# Patient Record
Sex: FEMALE | Race: WHITE | NOT HISPANIC OR LATINO
[De-identification: names, ages, dates, MRNs, and addresses within clinical notes are randomized per-mention and may not be internally consistent; named-entity substitution may affect disease eponyms.]

---

## 2023-01-19 ENCOUNTER — APPOINTMENT (OUTPATIENT)
Dept: VASCULAR SURGERY | Facility: CLINIC | Age: 63
End: 2023-01-19
Payer: COMMERCIAL

## 2023-01-19 VITALS
TEMPERATURE: 97.4 F | SYSTOLIC BLOOD PRESSURE: 108 MMHG | HEART RATE: 74 BPM | HEIGHT: 63 IN | BODY MASS INDEX: 24.45 KG/M2 | DIASTOLIC BLOOD PRESSURE: 76 MMHG | WEIGHT: 138 LBS | OXYGEN SATURATION: 99 %

## 2023-01-19 DIAGNOSIS — I83.813 VARICOSE VEINS OF BILATERAL LOWER EXTREMITIES WITH PAIN: ICD-10-CM

## 2023-01-19 DIAGNOSIS — I83.893 VARICOSE VEINS OF BILATERAL LOWER EXTREMITIES WITH OTHER COMPLICATIONS: ICD-10-CM

## 2023-01-19 PROBLEM — Z00.00 ENCOUNTER FOR PREVENTIVE HEALTH EXAMINATION: Status: ACTIVE | Noted: 2023-01-19

## 2023-01-19 PROCEDURE — 99203 OFFICE O/P NEW LOW 30 MIN: CPT | Mod: 25

## 2023-01-19 PROCEDURE — 93970 EXTREMITY STUDY: CPT

## 2023-01-19 NOTE — HISTORY OF PRESENT ILLNESS
[FreeTextEntry1] : Pt with hx breast cancer and has had b/l mastectomy with implants; allergy to adhesives.

## 2023-01-19 NOTE — PHYSICAL EXAM
[JVD] : no jugular venous distention  [2+] : left 2+ [Ankle Swelling (On Exam)] : present [Ankle Swelling Bilaterally] : bilaterally  [Ankle Swelling On The Right] : mild [Varicose Veins Of Lower Extremities] : bilaterally [Ankle Swelling On The Left] : moderate [] : bilaterally [No Rash or Lesion] : No rash or lesion [Purpura] : no purpura  [Petechiae] : no petechiae [Skin Ulcer] : no ulcer [Skin Induration] : no induration [Alert] : alert [Oriented to Person] : oriented to person [Oriented to Place] : oriented to place [Oriented to Time] : oriented to time [Calm] : calm [de-identified] : wdwn nad [de-identified] : wnl [FreeTextEntry1] : b/l diffuse surface varicose veins, no venous ulcers, intact arterial pulses [de-identified] : wnl [de-identified] : as above

## 2023-01-19 NOTE — REASON FOR VISIT
[Initial Evaluation] : an initial evaluation [FreeTextEntry1] : pt with hx b/l severe venous ds who has had b/l gsv stripping elsewhere in the remote past - has also had foam sclerotherapy and stab phlebectomy with Dr Justino Mcnair in the past - pt presents now after referral from Dr Chan for c/o b/l leg heaviness, aches, swelling and diffuse small surface veins both legs - b/l vle performed by RVT shows rt and left gsv not visualized c/w prior treatment, rt lsv ++ reflux, no dvt no svt no deep reflux b/l legs. Pt to wear waist high support hose 20-30 mmHg Rx given - will return for rt lsv rfa and then b/l sclerotherapy.

## 2023-02-02 ENCOUNTER — APPOINTMENT (OUTPATIENT)
Dept: VASCULAR SURGERY | Facility: CLINIC | Age: 63
End: 2023-02-02
Payer: COMMERCIAL

## 2023-02-02 VITALS
SYSTOLIC BLOOD PRESSURE: 107 MMHG | DIASTOLIC BLOOD PRESSURE: 73 MMHG | TEMPERATURE: 97.7 F | OXYGEN SATURATION: 98 % | HEART RATE: 73 BPM

## 2023-02-02 PROCEDURE — 93971 EXTREMITY STUDY: CPT | Mod: RT

## 2023-02-02 PROCEDURE — 99213 OFFICE O/P EST LOW 20 MIN: CPT | Mod: 25

## 2023-02-02 NOTE — PHYSICAL EXAM
[JVD] : no jugular venous distention  [2+] : left 2+ [Ankle Swelling (On Exam)] : present [Ankle Swelling Bilaterally] : bilaterally  [Ankle Swelling On The Right] : mild [Varicose Veins Of Lower Extremities] : bilaterally [Ankle Swelling On The Left] : moderate [] : bilaterally [No Rash or Lesion] : No rash or lesion [Purpura] : no purpura  [Petechiae] : no petechiae [Skin Ulcer] : no ulcer [Skin Induration] : no induration [Alert] : alert [Oriented to Person] : oriented to person [Oriented to Place] : oriented to place [Oriented to Time] : oriented to time [Calm] : calm [de-identified] : wdwn nad [de-identified] : wnl [FreeTextEntry1] : b/l diffuse surface varicose veins, no venous ulcers, intact arterial pulses [de-identified] : wnl [de-identified] : as above

## 2023-02-09 ENCOUNTER — APPOINTMENT (OUTPATIENT)
Dept: VASCULAR SURGERY | Facility: CLINIC | Age: 63
End: 2023-02-09

## 2023-05-11 ENCOUNTER — APPOINTMENT (OUTPATIENT)
Dept: VASCULAR SURGERY | Facility: CLINIC | Age: 63
End: 2023-05-11
Payer: COMMERCIAL

## 2023-05-11 VITALS
OXYGEN SATURATION: 97 % | SYSTOLIC BLOOD PRESSURE: 117 MMHG | TEMPERATURE: 97.2 F | DIASTOLIC BLOOD PRESSURE: 76 MMHG | HEART RATE: 83 BPM

## 2023-05-11 PROCEDURE — 36475 ENDOVENOUS RF 1ST VEIN: CPT | Mod: RT

## 2023-05-11 NOTE — REASON FOR VISIT
[Procedure: _________] : a [unfilled] procedure visit [FreeTextEntry1] : Patient has severe reflux right lsv and here for right lsv RFA procedure.  LYNDSAY CARLOS proceeded with right lsv RFA with no complications.  LYNDSAY CARLOS will follow up in one week for routine visit with VLE.\par

## 2023-05-11 NOTE — PROCEDURE
[FreeTextEntry1] : right lsv rfa [FreeTextEntry3] : \par Right lower extremity varicose veins with inflammation, fatigue, heaviness, pain, swelling, cramping heaviness, and dermatitis.  Pt was diagnosed with Chronic Venous insufficiency (CVI), chronic venous hypertension and venous reflux which requires thermal ablation with endovenous radiofrequency to treat the truncal/axial vein reflux (Radiofrequency Ablation, RFA).\par \par Ultrasound examination demonstrated reflux in the right LSV vein with reflux into the patient’s varicosities.  A trial of compression stockings, exercise, elevation, and pain medication was attempted without relief and as such, this patient was offered RFA as definitive treatment.  After explaining risks and benefits, informed consent was obtained and the patient was brought to the treatment room.\par \par The patient was escorted to the procedure room and placed in the prone position on the examination table.   The right leg was prepped and draped in the usual sterile fashion and time-out was called.  A sonographic probe was placed into a sterile sheath and the lower extremity was imaged. A suitable access site in the superficial truncal vein to be treated was identified using sonographic guidance and marked at the skin level. 1 mL of 1% lidocaine without epinephrine was administered subcutaneously using a 25G needle.  The RF catheter, dilator and introducer were flushed with saline and wiped down and placed on the sterile field.\par \par Using standard Seldinger technique, access to the saphenous vein was obtained with the needle and corresponding guidewire.  The needle was removed and the 7Fr introducer sheath with dilator was advanced over the wire into the vein. The guide wire and dilator were removed and the RFA fiber catheter was placed into the vein through the introducer sheath.  The position of the RFA  2 cm below the Sapheno-popliteal  Junction was verified using ultrasound guidance.     \par \par Local tumescent anesthesia, under ultrasound guidance, was administered circumferentially around the entire length of vein in the perivenous compartment to ensure a complete “halo” of anesthetic around the vein. \par Tumescent anesthesia:  250 mL 0.9% Sodium Chloride was poured into a sterile blue basin and 50 cc of sterile injectable lidocaine 1% (without Epinephrine) was added using a 20 mL syringe.\par \par The RFA location was again confirmed to be appropriate and the RFA energy was applied by pressing the hand button.  The proximal 3 cm was treated for 2 cycles at 20 seconds. The RFA fiber was then withdrawn in sequential 3 cm segments and each section was treated with one cycle of 20 seconds thermal energy application. The system was monitored to ensure that the vein wall temperature was within 120 +/- 5 degrees C and the generator output was well below its maximum starting power. \par \par The operation of the RFA was ceased when the fiber end was at its most distal marker as indicated by the distal tip mckeon lines.  The sheath and RFA fiber catheter were removed together and manual pressure was applied at access site for hemostasis.  Post procedure, the vein was imaged and showed successful closure along the entire treated length with a patent proximal most 2-3 cm and sapheno-popliteal junction.  Dry bandaid was applied to the access site and a compression stocking 20 - 30 mm Hg was applied to the treated extremity.  The patient tolerated the procedure well with no complications.  Vital signs stable, patient was monitored throughout procedure.  \par \par Refer to procedure sheet for procedure start and end time, # of total cycles,  total treated vein length treated,  and total treatment duration time documentation. \par \par Post-Op Instructions:  The following instructions were reviewed with the patient and a print out of the instructions provided to patient at time of visit:   1)  Compression stocking to be worn 24 hours per day x 7 days.  2)  Do not get the stocking wet for the first 3 days.  3)  Ambulation immediately following procedure and as much as possible for the first 7 days.  4)  Contact the office if any questions or concerns. 5)  Patient must follow-up within the first week for post procedure reflux study performed in office.  Reviewed with the patient the follow up visit is to ensure that there are no complications such as a deep vein thrombosis (DVT) or endovenous heat-induced thrombus (EHIT).  Patient acknowledged understanding of post-op instructions and was provided with print out of instructions at time of visit.  \par \par Reviewed with the patient post-procedure RFA  instructions, provided patient with printed copy of instructions along with Dr. Kessler’s cell phone number:  [phone], and advised patient to call for any questions or concerns prior to follow up visit.  All questions answered. \par \par \par

## 2023-05-22 ENCOUNTER — APPOINTMENT (OUTPATIENT)
Dept: VASCULAR SURGERY | Facility: CLINIC | Age: 63
End: 2023-05-22
Payer: COMMERCIAL

## 2023-05-22 VITALS
HEART RATE: 70 BPM | SYSTOLIC BLOOD PRESSURE: 113 MMHG | DIASTOLIC BLOOD PRESSURE: 80 MMHG | TEMPERATURE: 97.2 F | OXYGEN SATURATION: 98 %

## 2023-05-22 PROCEDURE — 99213 OFFICE O/P EST LOW 20 MIN: CPT | Mod: 25

## 2023-05-22 PROCEDURE — 93971 EXTREMITY STUDY: CPT | Mod: RT

## 2023-05-22 NOTE — REASON FOR VISIT
[Follow-Up: _____] : a [unfilled] follow-up visit [FreeTextEntry1] : She is s/p right lsv RFA.  Patient is here today for routine one week follow up visit with ultrasound.  Right leg ultrasound confirms no dvt, no EHIIT, no truncal reflux and right lsv is closed as desired.\par Pt to return for b/l sclerotherapy.

## 2023-05-22 NOTE — DATA REVIEWED
[FreeTextEntry1] : Dr Kessler performed rt lsv vle s/p recent RFA - rt lsv closed, no dvt, no ehit , rt sp junction patent and compressible

## 2023-05-22 NOTE — PHYSICAL EXAM
[2+] : left 2+ [Ankle Swelling (On Exam)] : present [Ankle Swelling Bilaterally] : bilaterally  [Ankle Swelling On The Right] : mild [Varicose Veins Of Lower Extremities] : bilaterally [Ankle Swelling On The Left] : moderate [No Rash or Lesion] : No rash or lesion [Alert] : alert [Oriented to Person] : oriented to person [Oriented to Place] : oriented to place [Oriented to Time] : oriented to time [Calm] : calm [JVD] : no jugular venous distention  [] : not present [Purpura] : no purpura  [Petechiae] : no petechiae [Skin Ulcer] : no ulcer [Skin Induration] : no induration [de-identified] : wdwn nad [de-identified] : wnl [FreeTextEntry1] : b/l diffuse surface varicose veins, no venous ulcers, intact arterial pulses [de-identified] : wnl [de-identified] : as above

## 2023-05-22 NOTE — REVIEW OF SYSTEMS
Plan: Discuss on ILK injection if the area becomes too painful or irritating. Detail Level: Zone [Negative] : Heme/Lymph [FreeTextEntry1] : as above

## 2023-06-06 ENCOUNTER — APPOINTMENT (OUTPATIENT)
Dept: VASCULAR SURGERY | Facility: CLINIC | Age: 63
End: 2023-06-06
Payer: COMMERCIAL

## 2023-06-06 VITALS
SYSTOLIC BLOOD PRESSURE: 112 MMHG | OXYGEN SATURATION: 98 % | DIASTOLIC BLOOD PRESSURE: 74 MMHG | TEMPERATURE: 97.7 F | HEART RATE: 69 BPM

## 2023-06-06 PROCEDURE — 36471 NJX SCLRSNT MLT INCMPTNT VN: CPT | Mod: RT

## 2023-06-06 NOTE — PROCEDURE
[FreeTextEntry1] : right leg sclerotherapy [FreeTextEntry3] : Right lower extremity sclerotherapy.\par \par Venous insufficiency.  Diffuse, painful right lower extremity small branch varicose veins with limb pain, swelling, itching, burning, tenderness and cramping.\par \par Ms. LYNDSAY CARLOS is a 62 year year old F  with right lower extremity symptomatic small branch varicose veins.   The patient presented to the office for localized sclerotherapy injections.  After explaining risks and benefits, informed consent was obtained.  All questions answered.\par \par The patient was brought into the examination room and placed supine on procedure table.  Right leg for sclerotherapy treatment is cleaned with 70% isopropyl alcohol.  Sclerosant was mixed using 2 mL of 0.9% Sodium Chloride Injection and 2 mL of 1% polidocanol (Asclera) using filtered needle into sterile 5 mL syringe; 4 mL of 0.5% Asclera injected.  Filtered needle removed from syringe and sterile 30 G ½ inch needle is attached to syringe with sclerosant.  Using a 30 gauge needle, the sclerosant was directly injected into the symptomatic, superficial small branch varicose veins.   Injections were performed on right lower extremity and the veins were mostly localized to the   [].  At completion, treated area was wiped with dry gauze and thigh high 20-30 mmHg compression stockings placed, to be worn x 72 hours.  Patient tolerated the procedure well with no complications.\par Reviewed with the patient post procedure, sclerotherapy, instructions:  Patient instructed to wear compression stocking continuously for 3 days (72 hours) following the procedure and may take the stocking off for daily shower.  Walk for 15-20 minutes immediately following the procedure and daily for the next few days.  Avoid heavy exercise, sunbathing, hot baths/sauna, extended periods of sitting or standing, such as long plane flights for 2-3 days after treatment. Do not be alarmed if you see bruises, brown spots, black streaks, and lumps.  These are part of the healing process, and most will resolve with time.  It can take several weeks to see final results and additional treatments may be required.  Advised patient to call the office should they have any questions or concerns following sclerotherapy treatment. All questions answered.\par \par \par \par

## 2023-06-06 NOTE — REASON FOR VISIT
[Procedure: _________] : a [unfilled] procedure visit [FreeTextEntry1] : Patient with small painful and cramping, tender superficial branch varicose veins.  She is here for sclerotherapy session, right leg.  Ms. LYNDSAY CARLOS will follow up in 2 weeks for next sclerotherapy session.\par

## 2023-06-21 ENCOUNTER — APPOINTMENT (OUTPATIENT)
Dept: VASCULAR SURGERY | Facility: CLINIC | Age: 63
End: 2023-06-21
Payer: COMMERCIAL

## 2023-06-21 VITALS
TEMPERATURE: 98.7 F | OXYGEN SATURATION: 100 % | SYSTOLIC BLOOD PRESSURE: 111 MMHG | HEART RATE: 74 BPM | DIASTOLIC BLOOD PRESSURE: 78 MMHG

## 2023-06-21 PROCEDURE — 36471 NJX SCLRSNT MLT INCMPTNT VN: CPT | Mod: LT

## 2023-06-21 NOTE — PROCEDURE
[FreeTextEntry1] : left posterior leg sclerotherapy [FreeTextEntry3] : Left lower extremity sclerotherapy.\par \par Venous insufficiency.  Diffuse, painful right lower extremity small branch varicose veins with limb pain, swelling, itching, burning, tenderness and cramping.\par \par Ms. LYNDSAY CARLOS is a 62 year year old F  with left lower extremity symptomatic small branch varicose veins.   The patient presented to the office for localized sclerotherapy injections.  After explaining risks and benefits, informed consent was obtained.  All questions answered.\par \par The patient was brought into the examination room and placed supine on procedure table.  Left leg for sclerotherapy treatment is cleaned with 70% isopropyl alcohol.  Sclerosant was mixed using 2 mL of 0.9% Sodium Chloride Injection and 2 mL of 1% polidocanol (Asclera) using filtered needle into sterile 5 mL syringe; 4 mL of 0.5% Asclera injected.  Filtered needle removed from syringe and sterile 30 G ½ inch needle is attached to syringe with sclerosant.  Using a 30 gauge needle, the sclerosant was directly injected into the symptomatic, superficial small branch varicose veins.   Injections were performed on left lower extremity and the veins were mostly localized to the   [].  At completion, treated area was wiped with dry gauze and thigh high 20-30 mmHg compression stockings placed, to be worn x 72 hours.  Patient tolerated the procedure well with no complications.\par Reviewed with the patient post procedure, sclerotherapy, instructions:  Patient instructed to wear compression stocking continuously for 3 days (72 hours) following the procedure and may take the stocking off for daily shower.  Walk for 15-20 minutes immediately following the procedure and daily for the next few days.  Avoid heavy exercise, sunbathing, hot baths/sauna, extended periods of sitting or standing, such as long plane flights for 2-3 days after treatment. Do not be alarmed if you see bruises, brown spots, black streaks, and lumps.  These are part of the healing process, and most will resolve with time.  It can take several weeks to see final results and additional treatments may be required.  Registered nurse advised patient to call the office should they have any questions or concerns following sclerotherapy treatment.  All questions answered.  \par \par \par \par

## 2023-06-21 NOTE — REASON FOR VISIT
[Procedure: _________] : a [unfilled] procedure visit [FreeTextEntry1] : Patient with small painful and cramping, tender superficial branch varicose veins.  She is here for sclerotherapy session, left leg.  Ms. LYNDSAY CARLOS will follow up in 2 weeks for next sclerotherapy session.\par

## 2023-07-06 ENCOUNTER — APPOINTMENT (OUTPATIENT)
Dept: VASCULAR SURGERY | Facility: CLINIC | Age: 63
End: 2023-07-06
Payer: COMMERCIAL

## 2023-07-06 VITALS
SYSTOLIC BLOOD PRESSURE: 122 MMHG | TEMPERATURE: 98.4 F | HEART RATE: 71 BPM | OXYGEN SATURATION: 98 % | DIASTOLIC BLOOD PRESSURE: 77 MMHG

## 2023-07-06 DIAGNOSIS — I87.2 VENOUS INSUFFICIENCY (CHRONIC) (PERIPHERAL): ICD-10-CM

## 2023-07-06 PROCEDURE — 36471 NJX SCLRSNT MLT INCMPTNT VN: CPT | Mod: LT

## 2023-07-06 NOTE — PROCEDURE
[FreeTextEntry1] : left leg sclerotherapy [FreeTextEntry3] : Left lower extremity sclerotherapy.\par \par Venous insufficiency.  Diffuse, painful right lower extremity small branch varicose veins with limb pain, swelling, itching, burning, tenderness and cramping.\par \par Ms. LYNDSAY CARLOS is a 62 year year old F  with left lower extremity symptomatic small branch varicose veins.   The patient presented to the office for localized sclerotherapy injections.  After explaining risks and benefits, informed consent was obtained.  All questions answered.\par \par The patient was brought into the examination room and placed supine on procedure table.  Left leg for sclerotherapy treatment is cleaned with 70% isopropyl alcohol.  Sclerosant was mixed using 2 mL of 0.9% Sodium Chloride Injection and 2 mL of 1% polidocanol (Asclera) using filtered needle into sterile 5 mL syringe; 4 mL of 0.5% Asclera injected.  Filtered needle removed from syringe and sterile 30 G ½ inch needle is attached to syringe with sclerosant.  Using a 30 gauge needle, the sclerosant was directly injected into the symptomatic, superficial small branch varicose veins.   Injections were performed on left lower extremity and the veins were mostly localized to the   [].  At completion, treated area was wiped with dry gauze and thigh high 20-30 mmHg compression stockings placed, to be worn x 72 hours.  Patient tolerated the procedure well with no complications.\par Reviewed with the patient post procedure, sclerotherapy, instructions:  Patient instructed to wear compression stocking continuously for 3 days (72 hours) following the procedure and may take the stocking off for daily shower.  Walk for 15-20 minutes immediately following the procedure and daily for the next few days.  Avoid heavy exercise, sunbathing, hot baths/sauna, extended periods of sitting or standing, such as long plane flights for 2-3 days after treatment. Do not be alarmed if you see bruises, brown spots, black streaks, and lumps.  These are part of the healing process, and most will resolve with time.  It can take several weeks to see final results and additional treatments may be required.  Registered nurse advised patient to call the office should they have any questions or concerns following sclerotherapy treatment.  All questions answered.  \par \par \par \par

## 2023-07-06 NOTE — REASON FOR VISIT
[Procedure: _________] : a [unfilled] procedure visit [FreeTextEntry1] : Patient with small painful and cramping, tender superficial branch varicose veins.  She is here for sclerotherapy session, left leg.  Ms. LYNDSYA CARLOS will follow up in 2 weeks for next sclerotherapy session.\par

## 2023-07-31 ENCOUNTER — APPOINTMENT (OUTPATIENT)
Dept: VASCULAR SURGERY | Facility: CLINIC | Age: 63
End: 2023-07-31
Payer: COMMERCIAL

## 2023-07-31 VITALS
DIASTOLIC BLOOD PRESSURE: 80 MMHG | OXYGEN SATURATION: 99 % | TEMPERATURE: 98.4 F | SYSTOLIC BLOOD PRESSURE: 120 MMHG | HEART RATE: 70 BPM

## 2023-07-31 PROCEDURE — 36471 NJX SCLRSNT MLT INCMPTNT VN: CPT | Mod: RT

## 2023-07-31 NOTE — PROCEDURE
[FreeTextEntry1] : right leg sclerotherapy [FreeTextEntry3] : right lower extremity sclerotherapy.  Venous insufficiency.  Diffuse, painful right lower extremity small branch varicose veins with limb pain, swelling, itching, burning, tenderness and cramping.  Ms. LYNDSAY CARLOS is a 62 year year old F  with right lower extremity symptomatic small branch varicose veins.   The patient presented to the office for localized sclerotherapy injections.  After explaining risks and benefits, informed consent was obtained.  All questions answered.  The patient was brought into the examination room and placed supine on procedure table.  right leg for sclerotherapy treatment is cleaned with 70% isopropyl alcohol.  Sclerosant was mixed using 2 mL of 0.9% Sodium Chloride Injection and 2 mL of 1% polidocanol (Asclera) using filtered needle into sterile 5 mL syringe; 4 mL of 0.5% Asclera injected.  Filtered needle removed from syringe and sterile 30 G  inch needle is attached to syringe with sclerosant.  Using a 30 gauge needle, the sclerosant was directly injected into the symptomatic, superficial small branch varicose veins.   Injections were performed on right lower extremity and the veins were mostly localized to the   ~  ~.  At completion, treated area was wiped with dry gauze and thigh high 20-30 mmHg compression stockings placed, to be worn x 72 hours.  Patient tolerated the procedure well with no complications. Reviewed with the patient post procedure, sclerotherapy, instructions:  Patient instructed to wear compression stocking continuously for 3 days (72 hours) following the procedure and may take the stocking off for daily shower.  Walk for 15-20 minutes immediately following the procedure and daily for the next few days.  Avoid heavy exercise, sunbathing, hot baths/sauna, extended periods of sitting or standing, such as long plane flights for 2-3 days after treatment. Do not be alarmed if you see bruises, brown spots, black streaks, and lumps.  These are part of the healing process, and most will resolve with time.  It can take several weeks to see final results and additional treatments may be required.  Registered nurse advised patient to call the office should they have any questions or concerns following sclerotherapy treatment.  All questions answered.

## 2023-07-31 NOTE — REASON FOR VISIT
[Procedure: _________] : a [unfilled] procedure visit [FreeTextEntry1] : Patient with small painful and cramping, tender superficial branch varicose veins.  She is here for sclerotherapy session, right leg.  Ms. LYNDSAY CARLOS will follow up in 2 weeks for next sclerotherapy session.

## 2023-08-23 ENCOUNTER — APPOINTMENT (OUTPATIENT)
Dept: VASCULAR SURGERY | Facility: CLINIC | Age: 63
End: 2023-08-23
Payer: COMMERCIAL

## 2023-08-23 VITALS
OXYGEN SATURATION: 98 % | HEART RATE: 61 BPM | TEMPERATURE: 97.3 F | SYSTOLIC BLOOD PRESSURE: 127 MMHG | DIASTOLIC BLOOD PRESSURE: 84 MMHG

## 2023-08-23 PROCEDURE — 36471 NJX SCLRSNT MLT INCMPTNT VN: CPT | Mod: LT

## 2023-08-23 NOTE — PROCEDURE
[FreeTextEntry1] : left posterior leg sclerotherapy [FreeTextEntry3] : Left lower extremity sclerotherapy.  Venous insufficiency.  Diffuse, painful right lower extremity small branch varicose veins with limb pain, swelling, itching, burning, tenderness and cramping.  Ms. LYNDSAY CARLOS is a 62 year year old F  with left lower extremity symptomatic small branch varicose veins.   The patient presented to the office for localized sclerotherapy injections.  After explaining risks and benefits, informed consent was obtained.  All questions answered.  The patient was brought into the examination room and placed supine on procedure table.  Left leg for sclerotherapy treatment is cleaned with 70% isopropyl alcohol.  Sclerosant was mixed using 2 mL of 0.9% Sodium Chloride Injection and 2 mL of 1% polidocanol (Asclera) using filtered needle into sterile 5 mL syringe; 4 mL of 0.5% Asclera injected.  Filtered needle removed from syringe and sterile 30 G  inch needle is attached to syringe with sclerosant.  Using a 30 gauge needle, the sclerosant was directly injected into the symptomatic, superficial small branch varicose veins.   Injections were performed on left lower extremity and the veins were mostly localized to the   ~  ~.  At completion, treated area was wiped with dry gauze and thigh high 20-30 mmHg compression stockings placed, to be worn x 72 hours.  Patient tolerated the procedure well with no complications. Reviewed with the patient post procedure, sclerotherapy, instructions:  Patient instructed to wear compression stocking continuously for 3 days (72 hours) following the procedure and may take the stocking off for daily shower.  Walk for 15-20 minutes immediately following the procedure and daily for the next few days.  Avoid heavy exercise, sunbathing, hot baths/sauna, extended periods of sitting or standing, such as long plane flights for 2-3 days after treatment. Do not be alarmed if you see bruises, brown spots, black streaks, and lumps.  These are part of the healing process, and most will resolve with time.  It can take several weeks to see final results and additional treatments may be required.  Registered nurse advised patient to call the office should they have any questions or concerns following sclerotherapy treatment.  All questions answered.

## 2023-08-23 NOTE — REASON FOR VISIT
[Procedure: _________] : a [unfilled] procedure visit [FreeTextEntry1] : Patient with small painful and cramping, tender superficial branch varicose veins.  She is here for sclerotherapy session, left posterior leg.  Ms. LYNDSAY CARLOS will follow up in 2 weeks for next sclerotherapy session.

## 2023-09-05 ENCOUNTER — APPOINTMENT (OUTPATIENT)
Dept: VASCULAR SURGERY | Facility: CLINIC | Age: 63
End: 2023-09-05
Payer: COMMERCIAL

## 2023-09-05 VITALS
TEMPERATURE: 96.3 F | DIASTOLIC BLOOD PRESSURE: 72 MMHG | OXYGEN SATURATION: 98 % | HEART RATE: 69 BPM | SYSTOLIC BLOOD PRESSURE: 104 MMHG

## 2023-09-05 PROCEDURE — 36471 NJX SCLRSNT MLT INCMPTNT VN: CPT | Mod: RT

## 2023-09-05 NOTE — PROCEDURE
[FreeTextEntry1] : right posterior leg sclerotherapy [FreeTextEntry3] : Posterior Right lower extremity sclerotherapy.  Venous insufficiency.  Diffuse, painful right lower extremity small branch varicose veins with limb pain, swelling, itching, burning, tenderness and cramping.  Ms. LYNDSAY CARLOS is a 62 year year old F  with right lower extremity symptomatic small branch varicose veins.   The patient presented to the office for localized sclerotherapy injections.  After explaining risks and benefits, informed consent was obtained.  All questions answered.  The patient was brought into the examination room and placed supine on procedure table.  Right leg for sclerotherapy treatment is cleaned with 70% isopropyl alcohol.  Sclerosant was mixed using 2 mL of 0.9% Sodium Chloride Injection and 2 mL of 1% polidocanol (Asclera) using filtered needle into sterile 5 mL syringe; 4 mL of 0.5% Asclera injected.  Filtered needle removed from syringe and sterile 30 G  inch needle is attached to syringe with sclerosant.  Using a 30 gauge needle, the sclerosant was directly injected into the symptomatic, superficial small branch varicose veins.   Injections were performed on right lower extremity and the veins were mostly localized to the   ~  ~.  At completion, treated area was wiped with dry gauze and thigh high 20-30 mmHg compression stockings placed, to be worn x 72 hours.  Patient tolerated the procedure well with no complications. Reviewed with the patient post procedure, sclerotherapy, instructions:  Patient instructed to wear compression stocking continuously for 3 days (72 hours) following the procedure and may take the stocking off for daily shower.  Walk for 15-20 minutes immediately following the procedure and daily for the next few days.  Avoid heavy exercise, sunbathing, hot baths/sauna, extended periods of sitting or standing, such as long plane flights for 2-3 days after treatment. Do not be alarmed if you see bruises, brown spots, black streaks, and lumps.  These are part of the healing process, and most will resolve with time.  It can take several weeks to see final results and additional treatments may be required.  Advised patient to call the office should they have any questions or concerns following sclerotherapy treatment. All questions answered.

## 2023-09-19 ENCOUNTER — APPOINTMENT (OUTPATIENT)
Dept: VASCULAR SURGERY | Facility: CLINIC | Age: 63
End: 2023-09-19
Payer: COMMERCIAL

## 2023-09-19 VITALS
DIASTOLIC BLOOD PRESSURE: 82 MMHG | SYSTOLIC BLOOD PRESSURE: 118 MMHG | HEART RATE: 71 BPM | TEMPERATURE: 97.3 F | OXYGEN SATURATION: 99 %

## 2023-09-19 PROCEDURE — 36471 NJX SCLRSNT MLT INCMPTNT VN: CPT | Mod: LT

## 2023-10-04 ENCOUNTER — APPOINTMENT (OUTPATIENT)
Dept: VASCULAR SURGERY | Facility: CLINIC | Age: 63
End: 2023-10-04
Payer: COMMERCIAL

## 2023-10-04 VITALS
SYSTOLIC BLOOD PRESSURE: 107 MMHG | DIASTOLIC BLOOD PRESSURE: 81 MMHG | OXYGEN SATURATION: 96 % | TEMPERATURE: 98.6 F | HEART RATE: 81 BPM

## 2023-10-04 DIAGNOSIS — I83.11 VARICOSE VEINS OF RIGHT LOWER EXTREMITY WITH INFLAMMATION: ICD-10-CM

## 2023-10-04 PROCEDURE — 36471 NJX SCLRSNT MLT INCMPTNT VN: CPT | Mod: RT

## 2023-10-18 ENCOUNTER — APPOINTMENT (OUTPATIENT)
Dept: VASCULAR SURGERY | Facility: CLINIC | Age: 63
End: 2023-10-18
Payer: COMMERCIAL

## 2023-10-18 VITALS
SYSTOLIC BLOOD PRESSURE: 111 MMHG | TEMPERATURE: 97.3 F | HEART RATE: 83 BPM | DIASTOLIC BLOOD PRESSURE: 78 MMHG | OXYGEN SATURATION: 99 %

## 2023-10-18 DIAGNOSIS — I83.12 VARICOSE VEINS OF LEFT LOWER EXTREMITY WITH INFLAMMATION: ICD-10-CM

## 2023-10-18 PROCEDURE — 36471 NJX SCLRSNT MLT INCMPTNT VN: CPT | Mod: LT

## 2023-11-01 ENCOUNTER — APPOINTMENT (OUTPATIENT)
Dept: VASCULAR SURGERY | Facility: CLINIC | Age: 63
End: 2023-11-01
Payer: COMMERCIAL

## 2023-11-01 VITALS
TEMPERATURE: 97.2 F | OXYGEN SATURATION: 99 % | HEART RATE: 72 BPM | DIASTOLIC BLOOD PRESSURE: 85 MMHG | SYSTOLIC BLOOD PRESSURE: 123 MMHG

## 2023-11-01 DIAGNOSIS — M79.605 PAIN IN LEFT LEG: ICD-10-CM

## 2023-11-01 PROCEDURE — 36471 NJX SCLRSNT MLT INCMPTNT VN: CPT | Mod: LT

## 2023-11-15 ENCOUNTER — APPOINTMENT (OUTPATIENT)
Dept: VASCULAR SURGERY | Facility: CLINIC | Age: 63
End: 2023-11-15
Payer: COMMERCIAL

## 2023-11-15 VITALS
TEMPERATURE: 97.1 F | DIASTOLIC BLOOD PRESSURE: 80 MMHG | HEART RATE: 82 BPM | OXYGEN SATURATION: 97 % | SYSTOLIC BLOOD PRESSURE: 119 MMHG

## 2023-11-15 PROCEDURE — 36471 NJX SCLRSNT MLT INCMPTNT VN: CPT | Mod: RT

## 2023-11-28 ENCOUNTER — TRANSCRIPTION ENCOUNTER (OUTPATIENT)
Age: 63
End: 2023-11-28

## 2023-11-29 ENCOUNTER — APPOINTMENT (OUTPATIENT)
Dept: VASCULAR SURGERY | Facility: CLINIC | Age: 63
End: 2023-11-29
Payer: COMMERCIAL

## 2023-11-29 VITALS
HEART RATE: 78 BPM | TEMPERATURE: 96.7 F | OXYGEN SATURATION: 97 % | SYSTOLIC BLOOD PRESSURE: 95 MMHG | DIASTOLIC BLOOD PRESSURE: 62 MMHG

## 2023-11-29 PROCEDURE — 36471 NJX SCLRSNT MLT INCMPTNT VN: CPT | Mod: LT

## 2024-01-02 ENCOUNTER — APPOINTMENT (OUTPATIENT)
Dept: VASCULAR SURGERY | Facility: CLINIC | Age: 64
End: 2024-01-02
Payer: COMMERCIAL

## 2024-01-02 VITALS
HEART RATE: 86 BPM | OXYGEN SATURATION: 99 % | SYSTOLIC BLOOD PRESSURE: 95 MMHG | TEMPERATURE: 97.6 F | DIASTOLIC BLOOD PRESSURE: 61 MMHG

## 2024-01-02 PROCEDURE — 36471 NJX SCLRSNT MLT INCMPTNT VN: CPT | Mod: LT

## 2024-01-02 NOTE — PROCEDURE
[FreeTextEntry1] : left posterior calf sclerotherapy [FreeTextEntry3] : Left lower extremity sclerotherapy.  Venous insufficiency.  Diffuse, painful right lower extremity small branch varicose veins with limb pain, swelling, itching, burning, tenderness and cramping.  Ms. LYNDSAY CARLOS is a 63 year year old F  with left lower extremity symptomatic small branch varicose veins.   The patient presented to the office for localized sclerotherapy injections.  After explaining risks and benefits, informed consent was obtained.  All questions answered.  The patient was brought into the examination room and placed supine on procedure table.  Left leg for sclerotherapy treatment is cleaned with 70% isopropyl alcohol.  Sclerosant was mixed using 2 mL of 0.9% Sodium Chloride Injection and 2 mL of 1% polidocanol (Asclera) using filtered needle into sterile 5 mL syringe; 4 mL of 0.5% Asclera injected.  Filtered needle removed from syringe and sterile 30 G  inch needle is attached to syringe with sclerosant.  Using a 30 gauge needle, the sclerosant was directly injected into the symptomatic, superficial small branch varicose veins.   Injections were performed on left lower extremity and the veins were mostly localized to the   ~  ~.  At completion, treated area was wiped with dry gauze and thigh high 20-30 mmHg compression stockings placed, to be worn x 72 hours.  Patient tolerated the procedure well with no complications. Reviewed with the patient post procedure, sclerotherapy, instructions:  Patient instructed to wear compression stocking continuously for 3 days (72 hours) following the procedure and may take the stocking off for daily shower.  Walk for 15-20 minutes immediately following the procedure and daily for the next few days.  Avoid heavy exercise, sunbathing, hot baths/sauna, extended periods of sitting or standing, such as long plane flights for 2-3 days after treatment. Do not be alarmed if you see bruises, brown spots, black streaks, and lumps.  These are part of the healing process, and most will resolve with time.  It can take several weeks to see final results and additional treatments may be required.  Registered nurse advised patient to call the office should they have any questions or concerns following sclerotherapy treatment.  All questions answered.

## 2024-01-02 NOTE — REASON FOR VISIT
[Procedure: _________] : a [unfilled] procedure visit [FreeTextEntry1] : Patient with small painful and cramping, tender superficial branch varicose veins.  She is here for sclerotherapy session, left posterior calf.  Ms. LYNDSAY CARLOS will follow up in 2 weeks for next sclerotherapy session.

## 2024-01-23 ENCOUNTER — APPOINTMENT (OUTPATIENT)
Dept: VASCULAR SURGERY | Facility: CLINIC | Age: 64
End: 2024-01-23
Payer: COMMERCIAL

## 2024-01-23 VITALS
OXYGEN SATURATION: 99 % | TEMPERATURE: 96.7 F | SYSTOLIC BLOOD PRESSURE: 120 MMHG | DIASTOLIC BLOOD PRESSURE: 81 MMHG | HEART RATE: 82 BPM

## 2024-01-23 PROCEDURE — 36471 NJX SCLRSNT MLT INCMPTNT VN: CPT | Mod: RT

## 2024-01-23 NOTE — PROCEDURE
[FreeTextEntry1] : right leg sclerotherapy [FreeTextEntry3] : Right lower extremity sclerotherapy.  Venous insufficiency.  Diffuse, painful right lower extremity small branch varicose veins with limb pain, swelling, itching, burning, tenderness and cramping.  Ms. LYNDSAY CARLOS is a 63 year year old F  with right lower extremity symptomatic small branch varicose veins.   The patient presented to the office for localized sclerotherapy injections.  After explaining risks and benefits, informed consent was obtained.  All questions answered.  The patient was brought into the examination room and placed supine on procedure table.  Right leg for sclerotherapy treatment is cleaned with 70% isopropyl alcohol.  Sclerosant was mixed using 2 mL of 0.9% Sodium Chloride Injection and 2 mL of 1% polidocanol (Asclera) using filtered needle into sterile 5 mL syringe; 4 mL of 0.5% Asclera injected.  Filtered needle removed from syringe and sterile 30 G  inch needle is attached to syringe with sclerosant.  Using a 30 gauge needle, the sclerosant was directly injected into the symptomatic, superficial small branch varicose veins.   Injections were performed on right lower extremity and the veins were mostly localized to the   ~  ~.  At completion, treated area was wiped with dry gauze and thigh high 20-30 mmHg compression stockings placed, to be worn x 72 hours.  Patient tolerated the procedure well with no complications. Reviewed with the patient post procedure, sclerotherapy, instructions:  Patient instructed to wear compression stocking continuously for 3 days (72 hours) following the procedure and may take the stocking off for daily shower.  Walk for 15-20 minutes immediately following the procedure and daily for the next few days.  Avoid heavy exercise, sunbathing, hot baths/sauna, extended periods of sitting or standing, such as long plane flights for 2-3 days after treatment. Do not be alarmed if you see bruises, brown spots, black streaks, and lumps.  These are part of the healing process, and most will resolve with time.  It can take several weeks to see final results and additional treatments may be required.  Advised patient to call the office should they have any questions or concerns following sclerotherapy treatment. All questions answered.

## 2024-02-08 ENCOUNTER — APPOINTMENT (OUTPATIENT)
Dept: VASCULAR SURGERY | Facility: CLINIC | Age: 64
End: 2024-02-08
Payer: COMMERCIAL

## 2024-02-08 VITALS
DIASTOLIC BLOOD PRESSURE: 78 MMHG | TEMPERATURE: 97.3 F | OXYGEN SATURATION: 97 % | SYSTOLIC BLOOD PRESSURE: 135 MMHG | HEART RATE: 80 BPM

## 2024-02-08 PROCEDURE — 36471 NJX SCLRSNT MLT INCMPTNT VN: CPT | Mod: LT

## 2024-02-08 NOTE — REASON FOR VISIT
[Procedure: _________] : a [unfilled] procedure visit [FreeTextEntry1] : Patient with small painful and cramping, tender superficial branch varicose veins.  She is here for sclerotherapy session, left leg.  Ms. LYNDSAY CARLOS will follow up in 2 weeks for next sclerotherapy session.

## 2024-02-08 NOTE — PROCEDURE
[FreeTextEntry1] : left posterior leg sclerotherapy [FreeTextEntry3] : Left lower extremity sclerotherapy.  Venous insufficiency.  Diffuse, painful right lower extremity small branch varicose veins with limb pain, swelling, itching, burning, tenderness and cramping.  Ms. LYNDSAY CARLOS is a 63 year year old F  with left lower extremity symptomatic small branch varicose veins.   The patient presented to the office for localized sclerotherapy injections.  After explaining risks and benefits, informed consent was obtained.  All questions answered.  The patient was brought into the examination room and placed supine on procedure table.  Left leg for sclerotherapy treatment is cleaned with 70% isopropyl alcohol.  Sclerosant was mixed using 2 mL of 0.9% Sodium Chloride Injection and 2 mL of 1% polidocanol (Asclera) using filtered needle into sterile 5 mL syringe; 4 mL of 0.5% Asclera injected.  Filtered needle removed from syringe and sterile 30 G  inch needle is attached to syringe with sclerosant.  Using a 30 gauge needle, the sclerosant was directly injected into the symptomatic, superficial small branch varicose veins.   Injections were performed on left lower extremity and the veins were mostly localized to the   ~  ~.  At completion, treated area was wiped with dry gauze and thigh high 20-30 mmHg compression stockings placed, to be worn x 72 hours.  Patient tolerated the procedure well with no complications. Reviewed with the patient post procedure, sclerotherapy, instructions:  Patient instructed to wear compression stocking continuously for 3 days (72 hours) following the procedure and may take the stocking off for daily shower.  Walk for 15-20 minutes immediately following the procedure and daily for the next few days.  Avoid heavy exercise, sunbathing, hot baths/sauna, extended periods of sitting or standing, such as long plane flights for 2-3 days after treatment. Do not be alarmed if you see bruises, brown spots, black streaks, and lumps.  These are part of the healing process, and most will resolve with time.  It can take several weeks to see final results and additional treatments may be required.  Registered nurse advised patient to call the office should they have any questions or concerns following sclerotherapy treatment.  All questions answered.

## 2024-02-14 ENCOUNTER — APPOINTMENT (OUTPATIENT)
Dept: PHYSICAL MEDICINE AND REHAB | Facility: CLINIC | Age: 64
End: 2024-02-14
Payer: COMMERCIAL

## 2024-02-14 VITALS
DIASTOLIC BLOOD PRESSURE: 78 MMHG | BODY MASS INDEX: 25.27 KG/M2 | RESPIRATION RATE: 18 BRPM | WEIGHT: 148 LBS | SYSTOLIC BLOOD PRESSURE: 104 MMHG | HEIGHT: 64 IN | HEART RATE: 78 BPM

## 2024-02-14 VITALS — BODY MASS INDEX: 27.46 KG/M2 | HEIGHT: 63 IN | WEIGHT: 155 LBS

## 2024-02-14 DIAGNOSIS — M47.819 SPONDYLOSIS W/OUT MYELOPATHY OR RADICULOPATHY, SITE UNSPECIFIED: ICD-10-CM

## 2024-02-14 DIAGNOSIS — Z78.9 OTHER SPECIFIED HEALTH STATUS: ICD-10-CM

## 2024-02-14 DIAGNOSIS — M54.9 DORSALGIA, UNSPECIFIED: ICD-10-CM

## 2024-02-14 DIAGNOSIS — R63.5 ABNORMAL WEIGHT GAIN: ICD-10-CM

## 2024-02-14 DIAGNOSIS — Z87.39 PERSONAL HISTORY OF OTHER DISEASES OF THE MUSCULOSKELETAL SYSTEM AND CONNECTIVE TISSUE: ICD-10-CM

## 2024-02-14 DIAGNOSIS — Z85.3 PERSONAL HISTORY OF MALIGNANT NEOPLASM OF BREAST: ICD-10-CM

## 2024-02-14 DIAGNOSIS — Z80.7 FAMILY HISTORY OF OTHER MALIGNANT NEOPLASMS OF LYMPHOID, HEMATOPOIETIC AND RELATED TISSUES: ICD-10-CM

## 2024-02-14 PROCEDURE — 99204 OFFICE O/P NEW MOD 45 MIN: CPT

## 2024-02-14 RX ORDER — NAPROXEN 500 MG/1
500 TABLET ORAL TWICE DAILY
Qty: 60 | Refills: 0 | Status: COMPLETED | COMMUNITY
Start: 2024-02-14 | End: 2024-03-15

## 2024-02-14 RX ORDER — TIRZEPATIDE 2.5 MG/.5ML
2.5 INJECTION, SOLUTION SUBCUTANEOUS
Qty: 4 | Refills: 1 | Status: ACTIVE | COMMUNITY
Start: 2024-02-14 | End: 1900-01-01

## 2024-02-14 RX ORDER — CHROMIUM 200 MCG
TABLET ORAL
Refills: 0 | Status: ACTIVE | COMMUNITY

## 2024-02-14 NOTE — HISTORY OF PRESENT ILLNESS
[FreeTextEntry1] :  Ms. LYNDSAY CARLOS is a very pleasant 63 year female who seen for evaluation of lower back pain that has been ongoing for 10 years, getting worse  without any specific injury or inciting event. The pain is located primarily lower back intermittent in nature and described as dull. The pain is rated as 6/10 during today's visit, and ranges from 3-8/10. The patient's symptoms are aggravated by walking and alleviated by rest, medication . The patient denies any night pain, numbness/tingling, weakness, or bowel/bladder dysfunction. The patient has no other complaints at this time. she is a   she does pilates  she is gaining weight despite exercise and watching diet  she has gained 10 lbs over last month  she is not sleeping well either  she had breast ca in past now stable so not a candidate for HRT

## 2024-02-14 NOTE — REVIEW OF SYSTEMS
[Fever] : no fever [Chills] : no chills [Recent Change In Weight] : ~T recent weight change [Lower Ext Edema] : no lower extremity edema [Shortness Of Breath] : no shortness of breath [Joint Pain] : joint pain [Muscle Weakness] : no muscle weakness [Difficulty Walking] : no difficulty walking [FreeTextEntry2] : gained 10 lbs recently

## 2024-02-14 NOTE — ASSESSMENT
[FreeTextEntry1] :  PLAN AND RECOMMENDATIONS :  We discussed differential diagnosis and clinical impression discussed HEP and low back core conditioning -she is fit and healthy  knows a good protocol  we went over some techniques   Recommend .symptomatic care and support  medications advise trail of zepbound for weight loss -her back has relpased with recent weight gain  shown how to inject and possible side effects  advise prescription NSAIDS for both pain and anti inflammation  to help with healing / calming the soft tissue and reducing swelling- for at least 2 weeks strict -Naprosyn 500 mg BID after food -warned of possible GI side effects -advised to take with meals or add over the counter Nexium, if sensitive- if GI upset (nausea, vomiting, heartburn) becomes noticeable -stop medication and notify the office.  she is well tuned into exercises  and technique good discussed ergonomics of bendng lifting and prolonged driving she lives in [place] away   imaging not needed  referral to endocrinology if weight gain continues   hydrotherapy /heat / cold for pain  continue  spinal precautions including care with bending, lifting, twisting and  carrying.  relative rest and avoidance of painful activity where possible   increasing activity as discussed   return for follow up 6 weeks

## 2024-02-21 ENCOUNTER — APPOINTMENT (OUTPATIENT)
Dept: VASCULAR SURGERY | Facility: CLINIC | Age: 64
End: 2024-02-21
Payer: COMMERCIAL

## 2024-02-21 VITALS
TEMPERATURE: 97.2 F | DIASTOLIC BLOOD PRESSURE: 81 MMHG | HEART RATE: 77 BPM | SYSTOLIC BLOOD PRESSURE: 110 MMHG | OXYGEN SATURATION: 99 %

## 2024-02-21 PROCEDURE — 36471 NJX SCLRSNT MLT INCMPTNT VN: CPT | Mod: RT

## 2024-03-06 ENCOUNTER — APPOINTMENT (OUTPATIENT)
Dept: VASCULAR SURGERY | Facility: CLINIC | Age: 64
End: 2024-03-06
Payer: COMMERCIAL

## 2024-03-06 VITALS
HEART RATE: 100 BPM | DIASTOLIC BLOOD PRESSURE: 81 MMHG | SYSTOLIC BLOOD PRESSURE: 123 MMHG | OXYGEN SATURATION: 95 % | TEMPERATURE: 97.3 F

## 2024-03-06 PROCEDURE — 36471 NJX SCLRSNT MLT INCMPTNT VN: CPT | Mod: LT

## 2024-03-20 ENCOUNTER — APPOINTMENT (OUTPATIENT)
Dept: VASCULAR SURGERY | Facility: CLINIC | Age: 64
End: 2024-03-20
Payer: COMMERCIAL

## 2024-03-20 VITALS
DIASTOLIC BLOOD PRESSURE: 76 MMHG | TEMPERATURE: 97.6 F | OXYGEN SATURATION: 99 % | SYSTOLIC BLOOD PRESSURE: 110 MMHG | HEART RATE: 80 BPM

## 2024-03-20 PROCEDURE — 36471 NJX SCLRSNT MLT INCMPTNT VN: CPT | Mod: RT

## 2024-04-03 ENCOUNTER — APPOINTMENT (OUTPATIENT)
Dept: VASCULAR SURGERY | Facility: CLINIC | Age: 64
End: 2024-04-03
Payer: COMMERCIAL

## 2024-04-03 VITALS
HEART RATE: 87 BPM | OXYGEN SATURATION: 98 % | SYSTOLIC BLOOD PRESSURE: 107 MMHG | TEMPERATURE: 96.9 F | DIASTOLIC BLOOD PRESSURE: 77 MMHG

## 2024-04-03 PROCEDURE — 36471 NJX SCLRSNT MLT INCMPTNT VN: CPT | Mod: LT

## 2024-04-16 ENCOUNTER — APPOINTMENT (OUTPATIENT)
Dept: VASCULAR SURGERY | Facility: CLINIC | Age: 64
End: 2024-04-16
Payer: COMMERCIAL

## 2024-04-16 VITALS
OXYGEN SATURATION: 98 % | HEART RATE: 77 BPM | SYSTOLIC BLOOD PRESSURE: 128 MMHG | TEMPERATURE: 97.1 F | DIASTOLIC BLOOD PRESSURE: 85 MMHG

## 2024-04-16 PROCEDURE — 36471 NJX SCLRSNT MLT INCMPTNT VN: CPT | Mod: RT

## 2024-04-18 NOTE — PROCEDURE
[FreeTextEntry1] : right leg scleortherapy [FreeTextEntry3] : Right lower extremity sclerotherapy.  Venous insufficiency.  Diffuse, painful right lower extremity small branch varicose veins with limb pain, swelling, itching, burning, tenderness and cramping.  Ms. LYNDSAY CARLOS is a 63 year year old F  with right lower extremity symptomatic small branch varicose veins.   The patient presented to the office for localized sclerotherapy injections.  After explaining risks and benefits, informed consent was obtained.  All questions answered.  The patient was brought into the examination room and placed supine on procedure table.  Right leg for sclerotherapy treatment is cleaned with 70% isopropyl alcohol.  Sclerosant was mixed using 2 mL of 0.9% Sodium Chloride Injection and 2 mL of 1% polidocanol (Asclera) using filtered needle into sterile 5 mL syringe; 4 mL of 0.5% Asclera injected.  Filtered needle removed from syringe and sterile 30 G  inch needle is attached to syringe with sclerosant.  Using a 30 gauge needle, the sclerosant was directly injected into the symptomatic, superficial small branch varicose veins.   Injections were performed on right lower extremity and the veins were mostly localized to the   ~  ~.  At completion, treated area was wiped with dry gauze and thigh high 20-30 mmHg compression stockings placed, to be worn x 72 hours.  Patient tolerated the procedure well with no complications. Reviewed with the patient post procedure, sclerotherapy, instructions:  Patient instructed to wear compression stocking continuously for 3 days (72 hours) following the procedure and may take the stocking off for daily shower.  Walk for 15-20 minutes immediately following the procedure and daily for the next few days.  Avoid heavy exercise, sunbathing, hot baths/sauna, extended periods of sitting or standing, such as long plane flights for 2-3 days after treatment. Do not be alarmed if you see bruises, brown spots, black streaks, and lumps.  These are part of the healing process, and most will resolve with time.  It can take several weeks to see final results and additional treatments may be required.  Advised patient to call the office should they have any questions or concerns following sclerotherapy treatment. All questions answered.

## 2024-05-16 ENCOUNTER — APPOINTMENT (OUTPATIENT)
Dept: VASCULAR SURGERY | Facility: CLINIC | Age: 64
End: 2024-05-16
Payer: COMMERCIAL

## 2024-05-16 VITALS
TEMPERATURE: 98.3 F | OXYGEN SATURATION: 99 % | SYSTOLIC BLOOD PRESSURE: 100 MMHG | DIASTOLIC BLOOD PRESSURE: 72 MMHG | HEART RATE: 90 BPM

## 2024-05-16 PROCEDURE — 36471 NJX SCLRSNT MLT INCMPTNT VN: CPT | Mod: LT

## 2024-05-16 NOTE — REASON FOR VISIT
[Procedure: _________] : a [unfilled] procedure visit [FreeTextEntry1] : Patient with small painful and cramping, tender superficial branch varicose veins.  She is here for sclerotherapy session,  .  Ms. LYNDSAY CARLOS will follow up in 2 weeks for next sclerotherapy session. l

## 2024-05-29 ENCOUNTER — APPOINTMENT (OUTPATIENT)
Dept: VASCULAR SURGERY | Facility: CLINIC | Age: 64
End: 2024-05-29
Payer: COMMERCIAL

## 2024-05-29 VITALS
SYSTOLIC BLOOD PRESSURE: 107 MMHG | TEMPERATURE: 97.4 F | HEART RATE: 85 BPM | OXYGEN SATURATION: 98 % | DIASTOLIC BLOOD PRESSURE: 76 MMHG

## 2024-05-29 DIAGNOSIS — I83.891 VARICOSE VEINS OF RIGHT LOWER EXTREMITY WITH OTHER COMPLICATIONS: ICD-10-CM

## 2024-05-29 DIAGNOSIS — I83.811 VARICOSE VEINS OF RIGHT LOWER EXTREMITY WITH PAIN: ICD-10-CM

## 2024-05-29 PROCEDURE — 36471 NJX SCLRSNT MLT INCMPTNT VN: CPT | Mod: RT

## 2024-06-12 ENCOUNTER — APPOINTMENT (OUTPATIENT)
Dept: VASCULAR SURGERY | Facility: CLINIC | Age: 64
End: 2024-06-12
Payer: COMMERCIAL

## 2024-06-12 VITALS
TEMPERATURE: 96.9 F | HEART RATE: 75 BPM | OXYGEN SATURATION: 97 % | DIASTOLIC BLOOD PRESSURE: 76 MMHG | SYSTOLIC BLOOD PRESSURE: 106 MMHG

## 2024-06-12 PROCEDURE — 36471 NJX SCLRSNT MLT INCMPTNT VN: CPT | Mod: LT

## 2024-06-25 ENCOUNTER — APPOINTMENT (OUTPATIENT)
Dept: VASCULAR SURGERY | Facility: CLINIC | Age: 64
End: 2024-06-25
Payer: COMMERCIAL

## 2024-06-25 VITALS
HEART RATE: 75 BPM | DIASTOLIC BLOOD PRESSURE: 75 MMHG | OXYGEN SATURATION: 98 % | SYSTOLIC BLOOD PRESSURE: 110 MMHG | TEMPERATURE: 97.3 F

## 2024-06-25 DIAGNOSIS — I83.892 VARICOSE VEINS OF LEFT LOWER EXTREMITY WITH OTHER COMPLICATIONS: ICD-10-CM

## 2024-06-25 DIAGNOSIS — I83.812 VARICOSE VEINS OF LEFT LOWER EXTREMITY WITH PAIN: ICD-10-CM

## 2024-06-25 PROCEDURE — 36471 NJX SCLRSNT MLT INCMPTNT VN: CPT | Mod: LT
